# Patient Record
Sex: MALE | NOT HISPANIC OR LATINO | Employment: OTHER | URBAN - METROPOLITAN AREA
[De-identification: names, ages, dates, MRNs, and addresses within clinical notes are randomized per-mention and may not be internally consistent; named-entity substitution may affect disease eponyms.]

---

## 2024-12-01 ENCOUNTER — HOSPITAL ENCOUNTER (EMERGENCY)
Facility: HOSPITAL | Age: 64
Discharge: HOME | End: 2024-12-02
Attending: EMERGENCY MEDICINE
Payer: COMMERCIAL

## 2024-12-01 DIAGNOSIS — N48.1 BALANITIS: Primary | ICD-10-CM

## 2024-12-01 LAB — GLUCOSE BLD MANUAL STRIP-MCNC: 361 MG/DL (ref 74–99)

## 2024-12-01 PROCEDURE — 99283 EMERGENCY DEPT VISIT LOW MDM: CPT | Performed by: EMERGENCY MEDICINE

## 2024-12-01 PROCEDURE — 82947 ASSAY GLUCOSE BLOOD QUANT: CPT

## 2024-12-01 PROCEDURE — 99285 EMERGENCY DEPT VISIT HI MDM: CPT | Performed by: EMERGENCY MEDICINE

## 2024-12-01 RX ORDER — METOPROLOL SUCCINATE 100 MG/1
100 TABLET, EXTENDED RELEASE ORAL DAILY
COMMUNITY

## 2024-12-01 RX ORDER — ASPIRIN 81 MG/1
81 TABLET ORAL
COMMUNITY

## 2024-12-01 RX ORDER — CLOTRIMAZOLE 1 %
CREAM (GRAM) TOPICAL 2 TIMES DAILY
Qty: 24 G | Refills: 0 | Status: SHIPPED | OUTPATIENT
Start: 2024-12-01 | End: 2024-12-15

## 2024-12-01 RX ORDER — TRIAMCINOLONE ACETONIDE 1 MG/G
CREAM TOPICAL
COMMUNITY

## 2024-12-01 RX ORDER — DOXYCYCLINE 100 MG/1
1 TABLET ORAL
COMMUNITY
Start: 2024-10-31

## 2024-12-01 RX ORDER — HYDROCORTISONE 1 %
1 CREAM (GRAM) TOPICAL 2 TIMES DAILY
Qty: 2 G | Refills: 0 | Status: SHIPPED | OUTPATIENT
Start: 2024-12-01 | End: 2024-12-08

## 2024-12-01 RX ORDER — LISINOPRIL 2.5 MG/1
2.5 TABLET ORAL
COMMUNITY
Start: 2024-06-18

## 2024-12-01 RX ORDER — METFORMIN HYDROCHLORIDE 500 MG/1
1000 TABLET ORAL
COMMUNITY

## 2024-12-01 RX ORDER — ATORVASTATIN CALCIUM 80 MG/1
80 TABLET, FILM COATED ORAL
COMMUNITY
Start: 2024-10-21

## 2024-12-01 ASSESSMENT — COLUMBIA-SUICIDE SEVERITY RATING SCALE - C-SSRS
6. HAVE YOU EVER DONE ANYTHING, STARTED TO DO ANYTHING, OR PREPARED TO DO ANYTHING TO END YOUR LIFE?: NO
1. IN THE PAST MONTH, HAVE YOU WISHED YOU WERE DEAD OR WISHED YOU COULD GO TO SLEEP AND NOT WAKE UP?: NO
2. HAVE YOU ACTUALLY HAD ANY THOUGHTS OF KILLING YOURSELF?: NO

## 2024-12-01 ASSESSMENT — PAIN - FUNCTIONAL ASSESSMENT: PAIN_FUNCTIONAL_ASSESSMENT: 0-10

## 2024-12-01 ASSESSMENT — PAIN SCALES - GENERAL: PAINLEVEL_OUTOF10: 0 - NO PAIN

## 2024-12-02 VITALS
SYSTOLIC BLOOD PRESSURE: 165 MMHG | RESPIRATION RATE: 18 BRPM | TEMPERATURE: 97.7 F | HEART RATE: 78 BPM | HEIGHT: 69 IN | DIASTOLIC BLOOD PRESSURE: 92 MMHG | BODY MASS INDEX: 31.8 KG/M2 | OXYGEN SATURATION: 98 % | WEIGHT: 214.73 LBS

## 2024-12-02 LAB
APPEARANCE UR: CLEAR
BILIRUB UR STRIP.AUTO-MCNC: NEGATIVE MG/DL
COLOR UR: COLORLESS
GLUCOSE UR STRIP.AUTO-MCNC: ABNORMAL MG/DL
HOLD SPECIMEN: NORMAL
KETONES UR STRIP.AUTO-MCNC: ABNORMAL MG/DL
LEUKOCYTE ESTERASE UR QL STRIP.AUTO: ABNORMAL
NITRITE UR QL STRIP.AUTO: NEGATIVE
PH UR STRIP.AUTO: 5 [PH]
PROT UR STRIP.AUTO-MCNC: NEGATIVE MG/DL
RBC # UR STRIP.AUTO: NEGATIVE /UL
RBC #/AREA URNS AUTO: NORMAL /HPF
SP GR UR STRIP.AUTO: 1.02
UROBILINOGEN UR STRIP.AUTO-MCNC: NORMAL MG/DL
WBC #/AREA URNS AUTO: NORMAL /HPF

## 2024-12-02 PROCEDURE — 81001 URINALYSIS AUTO W/SCOPE: CPT | Performed by: EMERGENCY MEDICINE

## 2024-12-02 PROCEDURE — 81003 URINALYSIS AUTO W/O SCOPE: CPT | Performed by: EMERGENCY MEDICINE

## 2024-12-02 PROCEDURE — 87086 URINE CULTURE/COLONY COUNT: CPT | Mod: STJLAB | Performed by: EMERGENCY MEDICINE

## 2024-12-02 NOTE — ED PROVIDER NOTES
"EMERGENCY DEPARTMENT ENCOUNTER      Pt Name: Roel Mcdaniel  MRN: [id number]  Birthdate 1960  Date of evaluation: 12/1/2024  Provider: Sadaf Flores MD    CHIEF COMPLAINT       Chief Complaint   Patient presents with    Male  Problem     Pt is uncircumcised and states the foreskin on his penis will not retract and isn't \"opening\".  He has had this happen before and the issue resolved itself.  This episode has lasted three weeks.  Pt states he is still able to void and denies any pain.         HISTORY OF PRESENT ILLNESS    A 64-year-old male with past medical history of TIIDM, s/p TAVR comes in to the emergency department due to painful foreskin on his penis that he is unable to retract over the penis shaft that started 2 to 3 weeks ago.  Patient stated to feel tension on the foreskin and endorses \"some smegma\" as his baseline but no blood or new discharge.  Patient reported that this is not the first time standing with this episode.  He reported frequency and burning sensation during urination.  Patient denies being sexually active. No rash, no fever and no blood in urine, no abdominal/suprapubic pain.         Nursing Notes were reviewed.    PAST MEDICAL HISTORY   History reviewed. No pertinent past medical history.      SURGICAL HISTORY     History reviewed. No pertinent surgical history.      CURRENT MEDICATIONS       Previous Medications    ASPIRIN 81 MG EC TABLET    Take 1 tablet (81 mg) by mouth once daily.    ATORVASTATIN (LIPITOR) 80 MG TABLET    Take 1 tablet (80 mg) by mouth once daily.    DOXYCYCLINE (ADOXA) 100 MG TABLET    Take 1 tablet (100 mg) by mouth every 12 hours.    LISINOPRIL 2.5 MG TABLET    Take 1 tablet (2.5 mg) by mouth once daily.    METFORMIN (GLUCOPHAGE) 500 MG TABLET    Take 2 tablets (1,000 mg) by mouth 2 times daily (morning and late afternoon).    METOPROLOL SUCCINATE XL (TOPROL-XL) 100 MG 24 HR TABLET    Take 1 tablet (100 mg) by mouth once daily.    TRIAMCINOLONE " (KENALOG) 0.1 % CREAM    APPLY A THIN LAYER TO AFFECTED AREA 2 TIMES A DAY AS NEEDED       ALLERGIES     Red dye    FAMILY HISTORY     No family history on file.       SOCIAL HISTORY       Social History     Socioeconomic History    Marital status:    Tobacco Use    Smoking status: Never    Smokeless tobacco: Never   Substance and Sexual Activity    Alcohol use: Never    Drug use: Never     Social Drivers of Health     Financial Resource Strain: Low Risk  (11/23/2022)    Received from [facility]    Overall Financial Resource Strain (CARDIA)     Difficulty of Paying Living Expenses: Not hard at all   Food Insecurity: No Food Insecurity (11/23/2022)    Received from [facility]    Hunger Vital Sign     Worried About Running Out of Food in the Last Year: Never true     Ran Out of Food in the Last Year: Never true   Transportation Needs: No Transportation Needs (11/23/2022)    Received from [facility]    PRAPARE - Transportation     Lack of Transportation (Medical): No     Lack of Transportation (Non-Medical): No   Physical Activity: Patient Declined (11/23/2022)    Received from [facility]    Exercise Vital Sign     Days of Exercise per Week: Patient declined     Minutes of Exercise per Session: Patient declined   Stress: Patient Declined (11/23/2022)    Received from [facility]    [employer] [place] [employer] - Occupational Stress Questionnaire     Feeling of Stress : Patient declined   Social Connections: Patient Declined (11/23/2022)    Received from [facility]    Social Connection and Isolation Panel [NHANES]     Frequency of Communication with Friends and Family: Patient declined     Frequency of Social Gatherings with Friends and Family: Patient declined     Attends Hinduism Services: Patient declined     Active Member of Clubs or Organizations: Patient declined     Attends Club or Organization Meetings: Patient declined     Marital Status: Patient declined   Intimate Partner Violence:  Not At Risk (11/23/2022)    Received from [facility]    Humiliation, Afraid, Rape, and Kick questionnaire     Fear of Current or Ex-Partner: No     Emotionally Abused: No     Physically Abused: No     Sexually Abused: No   Housing Stability: High Risk (11/23/2022)    Received from [facility]    Housing Stability Vital Sign     Unable to Pay for Housing in the Last Year: Yes     Number of Places Lived in the Last Year: 1     Unstable Housing in the Last Year: No       SCREENINGS                        PHYSICAL EXAM    (up to 7 for level 4, 8 or more for level 5)     ED Triage Vitals [12/01/24 2019]   Temperature Heart Rate Respirations BP   36.5 °C (97.7 °F) 76 18 (!) 213/100      Pulse Ox Temp Source Heart Rate Source Patient Position   98 % Temporal Monitor Sitting      BP Location FiO2 (%)     Right arm --       Physical Exam  Vitals reviewed. Exam conducted with a chaperone present.   HENT:      Head: Normocephalic.      Nose: Nose normal.      Mouth/Throat:      Mouth: Mucous membranes are moist.   Eyes:      Extraocular Movements: Extraocular movements intact.   Cardiovascular:      Rate and Rhythm: Normal rate and regular rhythm.      Heart sounds: Normal heart sounds.   Pulmonary:      Effort: Pulmonary effort is normal.      Breath sounds: Normal breath sounds.   Abdominal:      General: There is no distension.      Palpations: Abdomen is soft. There is no mass.      Tenderness: There is no abdominal tenderness. There is no guarding or rebound.      Hernia: No hernia is present.   Genitourinary:     Pubic Area: Rash present.      Penis: Uncircumcised. Phimosis, erythema and swelling present.       Testes: Normal.      Comments: Erythema with satellite-like lesion on left inguinal crease  Musculoskeletal:         General: Normal range of motion.   Skin:     General: Skin is warm.      Coloration: Skin is not jaundiced.      Findings: No bruising, erythema, lesion or rash.   Neurological:      General: No  focal deficit present.      Mental Status: He is alert and oriented to person, place, and time.   Psychiatric:         Mood and Affect: Mood normal.         Behavior: Behavior normal.          DIAGNOSTIC RESULTS     LABS:  Labs Reviewed - No data to display    All other labs were within normal range or not returned as of this dictation.    Imaging  No orders to display        Procedures  Procedures     EMERGENCY DEPARTMENT COURSE/MDM:     Diagnoses as of 12/01/24 2339   Balanitis        Medical Decision Making    A 64-year-old male is presenting with candida balanitis.   examination with phimosis, erythema in the urethra opening and satellite-like lesion on left inguinal crease.U/A negative for UTI. Urologist consulted (Dr. Wise) and recommended to discharge patient  with lotrimin 1% and hydrocortisone 1% to apply twice daily for 7 days.  Patient is aware of findings and plan.  Patient and or family in agreement and understanding of treatment plan.  All questions answered.      I reviewed the case with the attending ED physician. The attending ED physician agrees with the plan. Patient and/or patient´s representative was counseled regarding labs, imaging, likely diagnosis, and plan. All questions were answered.    ED Medications administered this visit:  Medications - No data to display    New Prescriptions from this visit:    New Prescriptions    No medications on file       Follow-up:  No follow-up provider specified.      Final Impression: No diagnosis found.      (Please note that portions of this note were completed with a voice recognition program.  Efforts were made to edit the dictations but occasionally words are mis-transcribed.)     Sadaf Flores MD  Resident  12/02/24 0046       Sadaf Flores MD  Resident  12/02/24 0047          The patient was seen by the resident/fellow.  I have personally performed a substantive portion of the encounter.  I have seen and examined the patient;  "agree with the workup, evaluation, MDM, management and diagnosis.  The care plan has been discussed with the resident; I have reviewed the resident’s note and agree with the documented findings.      I saw this patient with the intern for male penis problem.  This is a gentleman who is uncircumcised and states that he has difficulty pulling his foreskin back.  Patient states he has been urinating fine.  Patient states this is never happened before.  Patient states he has had no fevers chills cough cold or flu.  No dysuria.    Physical exam genitalia exam reveals the foreskin that is over the penis.  Is exquisitely tender with local irritation at the foreskin tip along with the glans of the penis.  I could retracted back partially but it was painful.  Testicles of the bilaterally distended.  There is some mild what appears to be yeast infection in the groins.    Medical decision making: I did contact Dr. Wise to urologist who agrees with good genitalia hygiene.  We will give also antifungal and a steroid.  I had a long discussion with the patient getting in the shower and cleaning it well and gently \"pulling his foreskin back cleaning the glans then slowly putting the foreskin back in its position.  I encourage lots of fluids to continue to have a urinary stream.  Patient states that he does get in the shower and he is able to with the water able to move the foreskin over the glans and back to its original position.  I advised him to take Tylenol versus Motrin for pain or discomfort.  We reviewed the medications well.  Patient seemed very very appreciative.                                                  Ian Guillaume, DO  12/02/24 1615    "

## 2024-12-03 LAB — BACTERIA UR CULT: NORMAL
